# Patient Record
Sex: MALE | Race: ASIAN | URBAN - METROPOLITAN AREA
[De-identification: names, ages, dates, MRNs, and addresses within clinical notes are randomized per-mention and may not be internally consistent; named-entity substitution may affect disease eponyms.]

---

## 2018-07-16 ENCOUNTER — APPOINTMENT (OUTPATIENT)
Dept: URBAN - METROPOLITAN AREA SURGERY 9 | Age: 38
Setting detail: DERMATOLOGY
End: 2018-07-16

## 2018-07-16 DIAGNOSIS — L40.0 PSORIASIS VULGARIS: ICD-10-CM

## 2018-07-16 PROBLEM — E13.9 OTHER SPECIFIED DIABETES MELLITUS WITHOUT COMPLICATIONS: Status: ACTIVE | Noted: 2018-07-16

## 2018-07-16 PROCEDURE — OTHER PRESCRIPTION: OTHER

## 2018-07-16 PROCEDURE — OTHER TREATMENT REGIMEN: OTHER

## 2018-07-16 PROCEDURE — 99202 OFFICE O/P NEW SF 15 MIN: CPT

## 2018-07-16 PROCEDURE — OTHER COUNSELING: OTHER

## 2018-07-16 RX ORDER — CALCIPOTRIENE 50 UG/G
OINTMENT TOPICAL
Qty: 3 | Refills: 0 | Status: ERX

## 2018-07-16 RX ORDER — CLOBETASOL PROPIONATE 0.5 MG/G
OINTMENT TOPICAL
Qty: 3 | Refills: 0 | Status: ERX

## 2018-07-16 ASSESSMENT — LOCATION SIMPLE DESCRIPTION DERM
LOCATION SIMPLE: LEFT KNEE
LOCATION SIMPLE: LEFT PRETIBIAL REGION
LOCATION SIMPLE: RIGHT PRETIBIAL REGION
LOCATION SIMPLE: LEFT FOOT
LOCATION SIMPLE: ANTERIOR SCALP
LOCATION SIMPLE: RIGHT FOREARM
LOCATION SIMPLE: UPPER BACK
LOCATION SIMPLE: RIGHT KNEE
LOCATION SIMPLE: LEFT UPPER ARM

## 2018-07-16 ASSESSMENT — LOCATION DETAILED DESCRIPTION DERM
LOCATION DETAILED: LEFT DISTAL PRETIBIAL REGION
LOCATION DETAILED: LEFT PROXIMAL POSTERIOR UPPER ARM
LOCATION DETAILED: RIGHT DISTAL PRETIBIAL REGION
LOCATION DETAILED: MID-FRONTAL SCALP
LOCATION DETAILED: RIGHT PROXIMAL DORSAL FOREARM
LOCATION DETAILED: LEFT KNEE
LOCATION DETAILED: INFERIOR THORACIC SPINE
LOCATION DETAILED: RIGHT KNEE
LOCATION DETAILED: LEFT DORSAL FOOT

## 2018-07-16 ASSESSMENT — LOCATION ZONE DERM
LOCATION ZONE: LEG
LOCATION ZONE: ARM
LOCATION ZONE: TRUNK
LOCATION ZONE: SCALP
LOCATION ZONE: FEET

## 2018-07-16 ASSESSMENT — BSA PSORIASIS: % BODY COVERED IN PSORIASIS: 7

## 2018-07-16 NOTE — PROCEDURE: TREATMENT REGIMEN
Other Instructions: Briefly discussed Otezla as an option. Name given so he can research it.\\nHe does not like to be in the sun it due to the heat. \\nWith his prediabetic state, methotrexate not be my first choice Plan: Briefly discussed Otezla as an option. Name given so he can research it.\\nHe does not like to be in the sun it due to the heat. \\nWith his prediabetic state, methotrexate not be my first choice

## 2018-07-16 NOTE — PROCEDURE: TREATMENT REGIMEN
Initiate Regimen: Clobetasol 0.05% ointment and Calcipotriene Ointment as directed. \\nDiscussed using the clobetasol on a limited basis (x2wks) for flaring and the calcipotriene is for maintenance.\\nCan use Clobetasol on weekends and then calcipotriene on weekdays long term if that works for him.\\n\\nHe wants to stay with topicals for now and see how they do since he has done well with them in the past. Start Regimen: Clobetasol 0.05% ointment and Calcipotriene Ointment as directed. \\nDiscussed using the clobetasol on a limited basis (x2wks) for flaring and the calcipotriene is for maintenance.\\nCan use Clobetasol on weekends and then calcipotriene on weekdays long term if that works for him.\\n\\nHe wants to stay with topicals for now and see how they do since he has done well with them in the past.

## 2018-10-22 ENCOUNTER — RX ONLY (RX ONLY)
Age: 38
End: 2018-10-22

## 2018-10-22 RX ORDER — CALCIPOTRIENE 50 UG/G
OINTMENT TOPICAL
Qty: 3 | Refills: 0 | Status: ERX

## 2018-10-22 RX ORDER — CLOBETASOL PROPIONATE 0.5 MG/G
OINTMENT TOPICAL
Qty: 3 | Refills: 0 | Status: ERX

## 2018-10-23 ENCOUNTER — RX ONLY (RX ONLY)
Age: 38
End: 2018-10-23

## 2018-10-23 RX ORDER — CALCIPOTRIENE 50 UG/G
OINTMENT TOPICAL
Qty: 3 | Refills: 0 | Status: ERX

## 2018-11-29 ENCOUNTER — APPOINTMENT (OUTPATIENT)
Dept: URBAN - METROPOLITAN AREA SURGERY 9 | Age: 38
Setting detail: DERMATOLOGY
End: 2018-11-29

## 2018-11-29 DIAGNOSIS — L40.0 PSORIASIS VULGARIS: ICD-10-CM

## 2018-11-29 PROCEDURE — OTHER PRESCRIPTION: OTHER

## 2018-11-29 PROCEDURE — OTHER TREATMENT REGIMEN: OTHER

## 2018-11-29 PROCEDURE — OTHER OTHER: OTHER

## 2018-11-29 PROCEDURE — OTHER COUNSELING: OTHER

## 2018-11-29 PROCEDURE — 99214 OFFICE O/P EST MOD 30 MIN: CPT

## 2018-11-29 RX ORDER — CLOBETASOL PROPIONATE 0.5 MG/G
OINTMENT TOPICAL
Qty: 6 | Refills: 0 | Status: ERX

## 2018-11-29 RX ORDER — CALCIPOTRIENE 50 UG/G
OINTMENT TOPICAL
Qty: 6 | Refills: 0 | Status: ERX

## 2018-11-29 ASSESSMENT — LOCATION DETAILED DESCRIPTION DERM
LOCATION DETAILED: LEFT PROXIMAL POSTERIOR UPPER ARM
LOCATION DETAILED: RIGHT ANTERIOR DISTAL THIGH
LOCATION DETAILED: LEFT VENTRAL PROXIMAL FOREARM
LOCATION DETAILED: LEFT DISTAL POSTERIOR THIGH
LOCATION DETAILED: RIGHT PROXIMAL DORSAL FOREARM
LOCATION DETAILED: LEFT ANTERIOR DISTAL THIGH
LOCATION DETAILED: INFERIOR THORACIC SPINE
LOCATION DETAILED: RIGHT PROXIMAL CALF
LOCATION DETAILED: MID-FRONTAL SCALP
LOCATION DETAILED: RIGHT BUTTOCK
LOCATION DETAILED: RIGHT DISTAL LATERAL POSTERIOR THIGH
LOCATION DETAILED: LEFT DISTAL CALF
LOCATION DETAILED: RIGHT VENTRAL PROXIMAL FOREARM
LOCATION DETAILED: LEFT DISTAL PRETIBIAL REGION
LOCATION DETAILED: EPIGASTRIC SKIN
LOCATION DETAILED: LEFT BUTTOCK
LOCATION DETAILED: RIGHT DISTAL PRETIBIAL REGION

## 2018-11-29 ASSESSMENT — BSA PSORIASIS: % BODY COVERED IN PSORIASIS: 25

## 2018-11-29 ASSESSMENT — LOCATION SIMPLE DESCRIPTION DERM
LOCATION SIMPLE: LEFT FOREARM
LOCATION SIMPLE: LEFT PRETIBIAL REGION
LOCATION SIMPLE: RIGHT FOREARM
LOCATION SIMPLE: RIGHT BUTTOCK
LOCATION SIMPLE: RIGHT POSTERIOR THIGH
LOCATION SIMPLE: LEFT THIGH
LOCATION SIMPLE: LEFT UPPER ARM
LOCATION SIMPLE: RIGHT THIGH
LOCATION SIMPLE: UPPER BACK
LOCATION SIMPLE: LEFT POSTERIOR THIGH
LOCATION SIMPLE: ABDOMEN
LOCATION SIMPLE: ANTERIOR SCALP
LOCATION SIMPLE: LEFT CALF
LOCATION SIMPLE: RIGHT PRETIBIAL REGION
LOCATION SIMPLE: RIGHT CALF
LOCATION SIMPLE: LEFT BUTTOCK

## 2018-11-29 ASSESSMENT — LOCATION ZONE DERM
LOCATION ZONE: TRUNK
LOCATION ZONE: LEG
LOCATION ZONE: SCALP
LOCATION ZONE: ARM

## 2018-11-29 NOTE — PROCEDURE: OTHER
Other (Free Text): Discussed with patient UV light treatment 3x weekly. His schedule would not allow for this. Discussed use and side effects with Otezla. Discussed biologic medications (immunosupression, infections, cancer/lymphoma).\\n\\nHe seemed very interested in dietary and other non-medical options for his psoriasis. I stressed that his psoriasis is well beyond topical therapy. With his severity I recommend against relying on unproven therapies. \\n\\nHe is very focused on why he is flaring. Stressed that the change in season may have a lot to do with it. He denied alcohol, smoking, weight gain, or stress as factors.\\n\\nI recommended Humira, he is hesitant about side effects from systemic therapy and eventually agreed to trying Otezla.  Reviewed that he should call and get in to change to Humira if he continues to worsen despite therapy. No pustules seen today. Discussed that if he is getting pus, we need to increase the intensity of therapy.
Detail Level: Detailed
Note Text (......Xxx Chief Complaint.): This diagnosis correlates with the

## 2018-11-29 NOTE — PROCEDURE: TREATMENT REGIMEN
Continue Regimen: Clobetasol 0.05% ointment and Calcipotriene Ointment as directed. \\nDiscussed using the clobetasol on a limited basis (x2wks) for flaring and the calcipotriene is for maintenance.\\nCan use Clobetasol on weekends and calcipotriene on weekdays long term\\nLimit calcipotriene to more than 1/2 of 120gm tube /week. Stressed that he will not be able to use it on all of his affected areas.

## 2019-01-10 ENCOUNTER — APPOINTMENT (OUTPATIENT)
Dept: URBAN - METROPOLITAN AREA SURGERY 9 | Age: 39
Setting detail: DERMATOLOGY
End: 2019-01-10

## 2019-01-10 DIAGNOSIS — L40.0 PSORIASIS VULGARIS: ICD-10-CM

## 2019-01-10 PROCEDURE — OTHER COUNSELING: OTHER

## 2019-01-10 PROCEDURE — 99213 OFFICE O/P EST LOW 20 MIN: CPT

## 2019-01-10 PROCEDURE — OTHER TREATMENT REGIMEN: OTHER

## 2019-01-10 PROCEDURE — OTHER OTHER: OTHER

## 2019-01-10 ASSESSMENT — LOCATION DETAILED DESCRIPTION DERM
LOCATION DETAILED: EPIGASTRIC SKIN
LOCATION DETAILED: RIGHT DISTAL PRETIBIAL REGION
LOCATION DETAILED: RIGHT DISTAL LATERAL POSTERIOR THIGH
LOCATION DETAILED: RIGHT BUTTOCK
LOCATION DETAILED: INFERIOR THORACIC SPINE
LOCATION DETAILED: RIGHT ANTERIOR DISTAL THIGH
LOCATION DETAILED: LEFT PROXIMAL POSTERIOR UPPER ARM
LOCATION DETAILED: LEFT ANTERIOR DISTAL THIGH
LOCATION DETAILED: LEFT BUTTOCK
LOCATION DETAILED: LEFT VENTRAL PROXIMAL FOREARM
LOCATION DETAILED: RIGHT PROXIMAL DORSAL FOREARM
LOCATION DETAILED: LEFT DISTAL POSTERIOR THIGH
LOCATION DETAILED: RIGHT VENTRAL PROXIMAL FOREARM
LOCATION DETAILED: LEFT DISTAL CALF
LOCATION DETAILED: LEFT DISTAL PRETIBIAL REGION
LOCATION DETAILED: RIGHT PROXIMAL CALF
LOCATION DETAILED: MID-FRONTAL SCALP

## 2019-01-10 ASSESSMENT — LOCATION ZONE DERM
LOCATION ZONE: LEG
LOCATION ZONE: ARM
LOCATION ZONE: SCALP
LOCATION ZONE: TRUNK

## 2019-01-10 ASSESSMENT — LOCATION SIMPLE DESCRIPTION DERM
LOCATION SIMPLE: UPPER BACK
LOCATION SIMPLE: RIGHT THIGH
LOCATION SIMPLE: ANTERIOR SCALP
LOCATION SIMPLE: LEFT FOREARM
LOCATION SIMPLE: LEFT UPPER ARM
LOCATION SIMPLE: RIGHT CALF
LOCATION SIMPLE: LEFT POSTERIOR THIGH
LOCATION SIMPLE: LEFT PRETIBIAL REGION
LOCATION SIMPLE: RIGHT PRETIBIAL REGION
LOCATION SIMPLE: RIGHT POSTERIOR THIGH
LOCATION SIMPLE: RIGHT BUTTOCK
LOCATION SIMPLE: RIGHT FOREARM
LOCATION SIMPLE: ABDOMEN
LOCATION SIMPLE: LEFT BUTTOCK
LOCATION SIMPLE: LEFT THIGH
LOCATION SIMPLE: LEFT CALF

## 2019-01-10 NOTE — PROCEDURE: OTHER
Note Text (......Xxx Chief Complaint.): This diagnosis correlates with the
Other (Free Text): He is very focused on why he is flaring. Stressed that the change in season may have a lot to do with it. He denied alcohol, smoking, or weight gain. He does stress about many things.
Detail Level: Detailed

## 2019-01-10 NOTE — PROCEDURE: TREATMENT REGIMEN
Action 2: Continue
Detail Level: Simple
Continue Regimen: Clobetasol 0.05% ointment and Calcipotriene Ointment as directed. \\nDiscussed using the clobetasol on a limited basis (x2wks) for flaring and the calcipotriene is for maintenance.\\nCan use Clobetasol on weekends and calcipotriene on weekdays long term\\nLimit calcipotriene to more than 1/2 of 120gm tube /week. Stressed that he will not be able to use it on all of his affected areas.\\nOtezla- remarkable improvement with just a short time on it. He still has a long way to go though. He should be needing less of the topicals soon
Otc Regimen: Cetaphil moisturizer has helped

## 2019-02-27 ENCOUNTER — APPOINTMENT (OUTPATIENT)
Dept: URBAN - METROPOLITAN AREA SURGERY 9 | Age: 39
Setting detail: DERMATOLOGY
End: 2019-02-27

## 2019-02-27 DIAGNOSIS — L81.0 POSTINFLAMMATORY HYPERPIGMENTATION: ICD-10-CM

## 2019-02-27 DIAGNOSIS — L40.0 PSORIASIS VULGARIS: ICD-10-CM

## 2019-02-27 PROCEDURE — OTHER COUNSELING: OTHER

## 2019-02-27 PROCEDURE — OTHER OTHER: OTHER

## 2019-02-27 PROCEDURE — OTHER TREATMENT REGIMEN: OTHER

## 2019-02-27 PROCEDURE — 99214 OFFICE O/P EST MOD 30 MIN: CPT

## 2019-02-27 ASSESSMENT — LOCATION DETAILED DESCRIPTION DERM
LOCATION DETAILED: RIGHT DISTAL LATERAL POSTERIOR THIGH
LOCATION DETAILED: RIGHT BUTTOCK
LOCATION DETAILED: RIGHT PROXIMAL CALF
LOCATION DETAILED: EPIGASTRIC SKIN
LOCATION DETAILED: LEFT DISTAL CALF
LOCATION DETAILED: RIGHT VENTRAL PROXIMAL FOREARM
LOCATION DETAILED: LEFT DISTAL PRETIBIAL REGION
LOCATION DETAILED: MID-FRONTAL SCALP
LOCATION DETAILED: RIGHT ANTERIOR DISTAL THIGH
LOCATION DETAILED: LEFT VENTRAL PROXIMAL FOREARM
LOCATION DETAILED: LEFT DISTAL POSTERIOR THIGH
LOCATION DETAILED: LEFT BUTTOCK
LOCATION DETAILED: RIGHT PROXIMAL DORSAL FOREARM
LOCATION DETAILED: INFERIOR THORACIC SPINE
LOCATION DETAILED: LEFT PROXIMAL POSTERIOR UPPER ARM
LOCATION DETAILED: RIGHT DISTAL PRETIBIAL REGION
LOCATION DETAILED: LEFT ANTERIOR DISTAL THIGH

## 2019-02-27 ASSESSMENT — LOCATION SIMPLE DESCRIPTION DERM
LOCATION SIMPLE: RIGHT CALF
LOCATION SIMPLE: ABDOMEN
LOCATION SIMPLE: LEFT CALF
LOCATION SIMPLE: LEFT FOREARM
LOCATION SIMPLE: UPPER BACK
LOCATION SIMPLE: LEFT PRETIBIAL REGION
LOCATION SIMPLE: LEFT UPPER ARM
LOCATION SIMPLE: LEFT POSTERIOR THIGH
LOCATION SIMPLE: RIGHT FOREARM
LOCATION SIMPLE: LEFT BUTTOCK
LOCATION SIMPLE: RIGHT PRETIBIAL REGION
LOCATION SIMPLE: RIGHT BUTTOCK
LOCATION SIMPLE: RIGHT POSTERIOR THIGH
LOCATION SIMPLE: ANTERIOR SCALP
LOCATION SIMPLE: LEFT THIGH
LOCATION SIMPLE: RIGHT THIGH

## 2019-02-27 ASSESSMENT — LOCATION ZONE DERM
LOCATION ZONE: LEG
LOCATION ZONE: ARM
LOCATION ZONE: TRUNK
LOCATION ZONE: SCALP

## 2019-02-27 ASSESSMENT — BSA PSORIASIS: % BODY COVERED IN PSORIASIS: 25

## 2019-02-27 NOTE — PROCEDURE: TREATMENT REGIMEN
Plan: I encouraged a change to Humira; long discussion on risks. Reviewed cardiovascular risks with untreated severe psoriasis. \\nHe wants to give Otezla 2more months for a total of 4mo. If no improvement in 2 months will reconsider switching to Humira.  Also discussed NBUVB as a treatment option. Patient wants to stick with current regimen for now and will try natural sunlight as weather warms Other Instructions: I encouraged a change to Humira; long discussion on risks. Reviewed cardiovascular risks with untreated severe psoriasis. \\nHe wants to give Otezla 2more months for a total of 4mo. If no improvement in 2 months will reconsider switching to Humira.  Also discussed NBUVB as a treatment option. Patient wants to stick with current regimen for now and will try natural sunlight as weather warms

## 2019-02-27 NOTE — PROCEDURE: OTHER
Detail Level: Detailed
Note Text (......Xxx Chief Complaint.): This diagnosis correlates with the
Other (Free Text): Most prominent on the legs. Reviewed that untreated, he will have increased pigmentation which can take years to fade especially on the legs.

## 2019-03-28 ENCOUNTER — RX ONLY (RX ONLY)
Age: 39
End: 2019-03-28

## 2019-03-28 RX ORDER — CLOBETASOL PROPIONATE 0.5 MG/G
OINTMENT TOPICAL
Qty: 6 | Refills: 0 | Status: ERX | COMMUNITY
Start: 2019-03-28

## 2019-03-28 RX ORDER — APREMILAST 30 MG/1
TABLET, FILM COATED ORAL
Qty: 60 | Refills: 2 | Status: ERX

## 2019-03-28 RX ORDER — CALCIPOTRIENE 50 UG/G
OINTMENT TOPICAL
Qty: 6 | Refills: 0 | Status: ERX | COMMUNITY
Start: 2019-03-28

## 2019-12-23 ENCOUNTER — RX ONLY (RX ONLY)
Age: 39
End: 2019-12-23

## 2019-12-23 RX ORDER — CALCIPOTRIENE 50 UG/G
OINTMENT TOPICAL
Qty: 6 | Refills: 0 | Status: ERX

## 2019-12-23 RX ORDER — CLOBETASOL PROPIONATE 0.5 MG/G
OINTMENT TOPICAL
Qty: 6 | Refills: 0 | Status: ERX

## 2020-05-13 ENCOUNTER — APPOINTMENT (OUTPATIENT)
Dept: URBAN - METROPOLITAN AREA SURGERY 9 | Age: 40
Setting detail: DERMATOLOGY
End: 2020-05-13

## 2020-05-13 DIAGNOSIS — L40.0 PSORIASIS VULGARIS: ICD-10-CM

## 2020-05-13 PROCEDURE — 99213 OFFICE O/P EST LOW 20 MIN: CPT | Mod: 95

## 2020-05-13 PROCEDURE — OTHER TREATMENT REGIMEN: OTHER

## 2020-05-13 PROCEDURE — OTHER PRESCRIPTION: OTHER

## 2020-05-13 RX ORDER — CLOBETASOL PROPIONATE 0.5 MG/G
OINTMENT TOPICAL
Qty: 6 | Refills: 2 | Status: ERX

## 2020-05-13 RX ORDER — CALCIPOTRIENE 50 UG/G
OINTMENT TOPICAL
Qty: 6 | Refills: 2 | Status: ERX

## 2020-05-13 ASSESSMENT — LOCATION SIMPLE DESCRIPTION DERM
LOCATION SIMPLE: RIGHT THIGH
LOCATION SIMPLE: LEFT BUTTOCK
LOCATION SIMPLE: RIGHT UPPER ARM
LOCATION SIMPLE: RIGHT PRETIBIAL REGION
LOCATION SIMPLE: RIGHT FOREARM
LOCATION SIMPLE: LEFT UPPER ARM
LOCATION SIMPLE: LEFT PRETIBIAL REGION
LOCATION SIMPLE: LEFT POSTERIOR THIGH
LOCATION SIMPLE: LEFT FOREARM
LOCATION SIMPLE: RIGHT BUTTOCK
LOCATION SIMPLE: LEFT THIGH
LOCATION SIMPLE: ABDOMEN
LOCATION SIMPLE: RIGHT POSTERIOR THIGH
LOCATION SIMPLE: UPPER BACK

## 2020-05-13 ASSESSMENT — LOCATION DETAILED DESCRIPTION DERM
LOCATION DETAILED: RIGHT ANTERIOR DISTAL THIGH
LOCATION DETAILED: RIGHT DISTAL PRETIBIAL REGION
LOCATION DETAILED: RIGHT VENTRAL PROXIMAL FOREARM
LOCATION DETAILED: LEFT DISTAL PRETIBIAL REGION
LOCATION DETAILED: LEFT BUTTOCK
LOCATION DETAILED: LEFT VENTRAL PROXIMAL FOREARM
LOCATION DETAILED: EPIGASTRIC SKIN
LOCATION DETAILED: LEFT DISTAL POSTERIOR THIGH
LOCATION DETAILED: RIGHT BUTTOCK
LOCATION DETAILED: RIGHT PROXIMAL POSTERIOR UPPER ARM
LOCATION DETAILED: INFERIOR THORACIC SPINE
LOCATION DETAILED: RIGHT DISTAL LATERAL POSTERIOR THIGH
LOCATION DETAILED: LEFT ANTERIOR PROXIMAL THIGH
LOCATION DETAILED: LEFT PROXIMAL POSTERIOR UPPER ARM

## 2020-05-13 ASSESSMENT — LOCATION ZONE DERM
LOCATION ZONE: TRUNK
LOCATION ZONE: ARM
LOCATION ZONE: LEG

## 2020-05-13 NOTE — PROCEDURE: TREATMENT REGIMEN
Start Regimen: Otezla this Fall prior to the Oct/Nov time when he typically flares Initiate Regimen: Otezla this Fall prior to the Oct/Nov time when he typically flares

## 2020-05-13 NOTE — PROCEDURE: TREATMENT REGIMEN
Continue Regimen: Clobetasol 0.05% ointment and Calcipotriene Ointment as directed. \\nDiscussed using the clobetasol on a limited basis (x2wks) for flaring and the calcipotriene is for maintenance.\\nCan use Clobetasol on weekends and calcipotriene on weekdays long term\\nLimit calcipotriene to no more than 1/2 of 120gm tube /week. He will not be able to use it on all of his affected areas.

## 2021-09-02 ENCOUNTER — APPOINTMENT (OUTPATIENT)
Dept: URBAN - METROPOLITAN AREA SURGERY 9 | Age: 41
Setting detail: DERMATOLOGY
End: 2021-09-05

## 2021-09-02 DIAGNOSIS — L40.0 PSORIASIS VULGARIS: ICD-10-CM

## 2021-09-02 PROCEDURE — OTHER PRESCRIPTION MEDICATION MANAGEMENT: OTHER

## 2021-09-02 PROCEDURE — OTHER PRESCRIPTION: OTHER

## 2021-09-02 PROCEDURE — 99214 OFFICE O/P EST MOD 30 MIN: CPT

## 2021-09-02 PROCEDURE — OTHER OTHER: OTHER

## 2021-09-02 PROCEDURE — OTHER REASSURANCE: OTHER

## 2021-09-02 RX ORDER — CALCIPOTRIENE 0.05 MG/G
OINTMENT TOPICAL
Qty: 720 | Refills: 2 | Status: ERX | COMMUNITY
Start: 2021-09-02

## 2021-09-02 RX ORDER — CLOBETASOL PROPIONATE 0.5 MG/G
OINTMENT TOPICAL
Qty: 360 | Refills: 2 | Status: ERX | COMMUNITY
Start: 2021-09-02

## 2021-09-02 ASSESSMENT — LOCATION DETAILED DESCRIPTION DERM
LOCATION DETAILED: RIGHT ANTERIOR DISTAL THIGH
LOCATION DETAILED: RIGHT VENTRAL PROXIMAL FOREARM
LOCATION DETAILED: RIGHT BUTTOCK
LOCATION DETAILED: LEFT DISTAL POSTERIOR THIGH
LOCATION DETAILED: LEFT VENTRAL PROXIMAL FOREARM
LOCATION DETAILED: LEFT BUTTOCK
LOCATION DETAILED: INFERIOR THORACIC SPINE
LOCATION DETAILED: EPIGASTRIC SKIN
LOCATION DETAILED: RIGHT PROXIMAL POSTERIOR UPPER ARM
LOCATION DETAILED: RIGHT DISTAL LATERAL POSTERIOR THIGH
LOCATION DETAILED: RIGHT DISTAL PRETIBIAL REGION
LOCATION DETAILED: LEFT ANTERIOR PROXIMAL THIGH
LOCATION DETAILED: LEFT PROXIMAL POSTERIOR UPPER ARM

## 2021-09-02 ASSESSMENT — BSA PSORIASIS: % BODY COVERED IN PSORIASIS: 15

## 2021-09-02 ASSESSMENT — LOCATION SIMPLE DESCRIPTION DERM
LOCATION SIMPLE: LEFT POSTERIOR THIGH
LOCATION SIMPLE: RIGHT UPPER ARM
LOCATION SIMPLE: RIGHT BUTTOCK
LOCATION SIMPLE: RIGHT PRETIBIAL REGION
LOCATION SIMPLE: ABDOMEN
LOCATION SIMPLE: LEFT UPPER ARM
LOCATION SIMPLE: RIGHT FOREARM
LOCATION SIMPLE: LEFT THIGH
LOCATION SIMPLE: RIGHT THIGH
LOCATION SIMPLE: LEFT BUTTOCK
LOCATION SIMPLE: LEFT FOREARM
LOCATION SIMPLE: UPPER BACK
LOCATION SIMPLE: RIGHT POSTERIOR THIGH

## 2021-09-02 ASSESSMENT — LOCATION ZONE DERM
LOCATION ZONE: ARM
LOCATION ZONE: TRUNK
LOCATION ZONE: LEG

## 2021-09-02 NOTE — PROCEDURE: PRESCRIPTION MEDICATION MANAGEMENT
Detail Level: Simple
Render In Strict Bullet Format?: No
Continue Regimen: calcipotriene 0.005 % topical ointment - Apply once to twice daily to body psoriasis\\n\\nclobetasol 0.05 % topical ointment - Apply to affected areas of body psoriasis twice daily for two weeks as needed when flaring, then just use on weekends. Do not apply to face/groin.

## 2021-09-02 NOTE — PROCEDURE: OTHER
Render Risk Assessment In Note?: no
Note Text (......Xxx Chief Complaint.): This diagnosis correlates with the
Detail Level: Simple
Other (Free Text): Discussed UV light therapy as treatment in the fall/winter months.  He believes it will be difficult for his schedule and he does not like heat.\\nHe may reconsider repeating Otezla or trying another therapy after COVID-19 pandemic has passed

## 2021-12-15 RX ORDER — CLOBETASOL PROPIONATE 0.5 MG/G
OINTMENT TOPICAL
Qty: 360 | Refills: 2 | Status: ERX

## 2021-12-15 RX ORDER — CALCIPOTRIENE 0.05 MG/G
OINTMENT TOPICAL
Qty: 720 | Refills: 2 | Status: ERX

## 2024-08-20 NOTE — PROCEDURE: TREATMENT REGIMEN
Continue Regimen: Clobetasol 0.05% ointment and Calcipotriene Ointment as directed. \\nOtezla not applicable